# Patient Record
(demographics unavailable — no encounter records)

---

## 2025-02-18 NOTE — HISTORY OF PRESENT ILLNESS
[EENT/Resp Symptoms] : EENT/RESPIRATORY SYMPTOMS [Nasal Congestion] : nasal congestion [Cough] : cough [Decreased Appetite] : decreased appetite [Headache] : headache [Fever] : no fever [Ear Pain] : no ear pain [Sore Throat] : no sore throat [Vomiting] : no vomiting [Diarrhea] : no diarrhea [Decreased Urine Output] : no decreased urine output [FreeTextEntry5] : +sneezing [de-identified] : Runny nose, congestion and sneezing [FreeTextEntry6] :  Mother states that pt. has congestion that started two weeks ago with congestion and coughing. No fever.

## 2025-02-18 NOTE — HISTORY OF PRESENT ILLNESS
[EENT/Resp Symptoms] : EENT/RESPIRATORY SYMPTOMS [Nasal Congestion] : nasal congestion [Cough] : cough [Decreased Appetite] : decreased appetite [Headache] : headache [Fever] : no fever [Ear Pain] : no ear pain [Sore Throat] : no sore throat [Vomiting] : no vomiting [Diarrhea] : no diarrhea [Decreased Urine Output] : no decreased urine output [FreeTextEntry5] : +sneezing [de-identified] : Runny nose, congestion and sneezing [FreeTextEntry6] :  Mother states that pt. has congestion that started two weeks ago with congestion and coughing. No fever.

## 2025-02-18 NOTE — DISCUSSION/SUMMARY
[FreeTextEntry1] :   - Acute Sinusitis:  as she's showing all the symptoms such as headaches, facial pressure, thick white nasal mucus, and a throat complaint. Her symptoms have also lasted for more than a week.     - Therapeutic Interventions: Prescribe a course of antibiotics. This should help break up everything in her sinuses and help her feel better sooner. Also, recommend using a daily allergy medication like Zyrtec, and a nasal spray like Flonase.     - Diagnostic Tests: None ordered at this visit.     - Referrals: No referrals needed at this time.     - Patient Education: Discussed the symptoms and treatment plan with the patient and her mother. Emphasized the potential for diarrhea with antibiotic usage and to take with food as well as a probiotic to help counteract this possible side effect. Also mentioned that sinus washes can be beneficial.     - Follow-Up: She is advised to return or call if her symptoms do not improve in a few days or if she gets significantly worse. Also advised to check in with her neurologist if her headaches become abnormally bad.

## 2025-03-27 NOTE — HISTORY OF PRESENT ILLNESS
[Mother] : mother [LMP: _____] : LMP: [unfilled] [Cycle Length: _____ days] : Cycle Length: [unfilled] days [Days of Bleeding: _____] : Days of bleeding: [unfilled] [Eats meals with family] : eats meals with family [Grade: ____] : Grade: [unfilled] [Normal Performance] : normal performance [Normal Behavior/Attention] : normal behavior/attention [Normal Homework] : normal homework [Has friends] : has friends [Has interests/participates in community activities/volunteers] : has interests/participates in community activities/volunteers. [No] : Patient has not had sexual intercourse. [Has ways to cope with stress] : has ways to cope with stress [Displays self-confidence] : displays self-confidence [With Teen] : teen [With Parent/Guardian] : parent/guardian

## 2025-03-27 NOTE — RISK ASSESSMENT
d/c  Short term goal 1: Pt will perform bed mobility with min A  Short term goal 2: Pt will perform transfers with LRAD and max Ax1  Short term goal 3: Pt will ambulate 5' with LRAD and max Ax1  Patient Goals   Patient goals : pt did not state       Therapy Time   Individual Concurrent Group Co-treatment   Time In 1111         Time Out 1206         Minutes 55              Timed Code Treatment Minutes:   40    Total Treatment Minutes:  [address] [facility] [address], [address] [facility], [address] [facility] [PHQ-2 Negative - No further assessment needed] : PHQ-2 Negative - No further assessment needed [PHQ-9 Negative - No further assessment needed] : PHQ-9 Negative - No further assessment needed

## 2025-04-03 NOTE — QUALITY MEASURES
[Impairment in more than one setting] : Impairment in more than one setting: Not Applicable [Coexisting conditions] : Coexisting conditions: Yes [Medication choices] : Medication choices: Not Applicable [Side effects of medications] : Side effects of medications: Yes

## 2025-04-03 NOTE — ASSESSMENT
[FreeTextEntry1] : Liliya is a bright 17 y/o girl with a history of migraines without aura,  anxiety and depression, and ADHD, and is here for an ADHD follow s/p last seen in 2023. At last visit she was taking Concerta 18 mg.   Her anxiety level was moderate despite being on vacation with family.  Recommended increasing dose of Concerta 27 mg when [facility].  I had discussed with Liliya benefits of psychotherapy which is 1st line for anxiety and depression.

## 2025-04-03 NOTE — PLAN
[FreeTextEntry1] :  - -Discussed stimulant medications; risks, benefits, possible side effects. -Recommended starting psychotherapy, CBT 1st line treatment for anxiety and depression.   -Continue with primary neurologist for headache management.  -Follow up ----

## 2025-04-03 NOTE — HISTORY OF PRESENT ILLNESS
[FreeTextEntry1] : Liliya is a bright 17 y/o girl with a history of migraines without aura, anxiety and depression, and ADHD, and is here for an ADHD follow s/p last seen in 2023. At last visit she was taking Concerta 18 mg.   Her anxiety level was moderate despite being on vacation with family.  Recommended increasing dose of Concerta 27 mg when she returned from trip to [facility].  I had discussed with Liliya benefits of psychotherapy which is 1st line for anxiety and depression.   PLAN FROM PREVIOUS VISIT (2023)  -Increase Concerta to 27 mg mg day when back from vacation on 08/20.  (taking 18 mg) -Discussed stimulant medications; risks, benefits, possible side effects. -Recommended starting psychotherapy, CBT 1st line treatment for anxiety and depression.   -Continue with primary neurologist for headache management.  -Follow up in 6 weeks for medication recheck, repeat GAD7 and PHQ9, and can ask [facility]EN[facility] Martinez for list of     therapists.   INTERIM HPI Has not followed up since 07/20/23 and no refills requested.       HPI FROM VISIT ON 07/20/23  Liliya is a very bright 13 y/o girl with a history of migraines without aura and anxiety and depression, and presents  for a mental health and ADHD follow up s/p increasing Concerta to 27 mg. At 18 mg dose, Concerta helped very slightly in terms of concentration, and mother feels she seems happier and more social. Concerta was well tolerated though patient had  a few palpitations which likely was attributed to her anxiety.   PLAN FROM PREVIOUS VISIT: - Increase Concerta 27 mg mg daily in AM -Discussed stimulant medications; risks, benefits, possible side effects.  -Follow up in 2 weeks. -Continue with primary neurologist for headache management.  INTERIM HPI  The family is in [facility] on vacation until August 20 th.  Liliya has not increased the Concerta to 27 mg yet, mother states she brought the medication but wanted to finish up the 18 mg dose first.  Liliya has been a little bit resistant to taking it, does not take it every day.  Mother wants her to take it as she feels it seems to help her anxiety. She has not had any palpitations.  Mother reports Liliya is still having a lot of anxiety.  She gets very anxious when she has to order something, talk to someone new, or will be going somewhere new, a new experience.  Today they were planning to leave to go somewhere around 9 am, and she started getting reading at 5 am. Liliya has been hesitant to start seeing a psychotherapist, and mother has tried to encourage her.  She states her anxiety level recently (on vacation) has been a 4 to 5 out of 10, back in [place] was 7/10.     FROM 06/01/23 VISIT Liliya is a very bright 13 y/o girl with a history of migraines without aura and anxiety and depression, and presents  for a mental health and ADHD follow up s/p initiating Concerta 18 mg.. At previous visit GAD7 and PHQ9 scores were positive for moderate anxiety (score 10) and depression (16).   Her attention difficulties have been longstanding and likely contributing to her anxiety and depression symptoms.  She is advanced classes and struggles to maintain her grades.  PLAN FROM PREVIOUS VISIT: -Start Concerta 18 mg daily in AM -Psychoeducation provided re: ADHD.  Resources for education given -Discussed management for ADHD including behavioral interventions and medication options -Discussed stimulant medications; risks, benefits, possible side effects. -Letter to request 504 Plan for school provided -Anxiety/depression screening done- moderate symptoms of both- safety planning discussed (no risks identified) -Mother looking for psychotherapist; Liliya will start with school psychologist.  -Family provided recent neuropsychological testing results.  -Follow up in 2 weeks. -Continue with primary neurologist for headache management.  INTERIM HPI  Taking Concerta 18 mg every day for about a week.  Notices slight improvement in concentration. Mother noticed an improvement, was able to find items of a scavenger hunt.   Seems to be more talkative, more conversant.  Well tolerated.  Has mild palpitations once which patient felt could be her anxiety symptoms.   -Anxiety/depression about the same- Mother looking for psychotherapist; Liliya sees school  psychologis   FROM 05/17/23 VISIT: Liliya is a  bright 13 y/o girl with a history of migraines, and anxiety and depression, and is here for a follow up  ADHD evaluation for inattention and behavioral concerns.  At previous visit GAD7 and PHQ9 scores were positive for moderate anxiety (score 10) and depression (16).   Her attention difficulties have been longstanding and patient/mother felt was the main contributor to her anxiety and depression symptoms.  She is advanced classes and struggles to maintain her grades.    INTERIM HPI Re: inattention AL FORMS RECEIVED; PARENT- MEETS CRITERIA FOR INATTENTIVE ADHD, also + anxiety/depression.  TEACHER-  DOES NOT MEET CRITERIA;  form received-scored negative to inattention (1/9), hyperactivity, ODD, anxiety or depression. Teacher comment: "as her , I just say strongly she should have been placed in a core class rather than the regent's class.  She tries very hard but the matter might be too difficult for her due to not having a strong foundation of 6-7th grade."  Liliya had recent neuropsych evaluation on 05/05/23 done by school psychologist.  Mother sent full report which was reviewed- IQ average range (97), and average scores in reading, writing and math.  Scores were significant for inattention symptoms,  depression, anxiety, and somatization She was provided with a  504 Plan that is only for the remainder of this school year while formal ADHD diagnosis is pending.  Mother reports she was diagnosed years ago with ADHD and tried a medication but it gave her palpitations.  She does not recall who diagnosed her or the name of the medication   Liliya would like to try an ADHD medication again. No improvement in grades; mother helping her stay focused during homework   Mother asking about requesting the school to perform testing to see if she would benefit from speech therapy or technology assisted devices (explained to mother that she further testing can be requested but likely denies as not evidence she needs this).   Re: anxiety/Depression:  Liliya feels "about the same." She denies any worsening symptoms or ever having thoughts of suicide or self harm.  GAD7 score is 13 (previously 10), same moderate anxiety range PHQ9 score is 15 (previously 160, same moderately severe depression range. Due to insurance issues, mother has not looked for a therapist.  She will have new insurance in a few months. Liliya is open to talking to her school psychologist.  She is receptive to learning about behavioral activation strategies such as going outdoors, talking to friends, getting enough sleep, trying meditation.     FROM 04/13/23 VISIT Liliya is a 13 y/o girl with a history of migraines without aura and major depressive disorder as was referred by her primary neurologist for an ADHD evaluation for inattention and behavioral concerns. "It is difficult to fully assess her etiology for the concentration difficulties, as patient's difficulty concentrating may stem from major depressive disorder (PHQ-9 score of 14 on 4/6/2023), anxiety (GAD7 score of 11 on 4/6/2023), or ADHD.  Because of this, it would become important for her to be seen by a behavioral health specialist for proper management of her symptoms and appropriate pharmacotherapy options."  FROM 04/06/23 F/U HPI WITH DR SANCHES ; Additionally, mother reports that her concentration has been poor.  Mother states that she is failing her exams but doing well on homework.  She notices that Liliya takes a long time to complete her school assignments.  According to Liliya, she oftentimes zones out in conversations with friends and does not remember details of conversations due to lack of attention.  Mother also reports mood lability, as Liliya frequently becomes angry with her mother whenever she is asked to do tasks at home and oftentimes feels like she withdraws from her friends during the weekends, as she doesn't want to interact with people.  Her mother is planning to obtain a psychoeducational evaluation at school to see if she would qualify for time extension and accommodations at school.  Previously, she was seeing a therapist for her depression but stopped a couple of months ago, since Liliya does not like to open up to people.  Mother states that Liliya had tried some anti-depressant medications in the past (unclear of which kind) but states that she has stopped them because they made her feel panicky.     BIRTH HISTORY Child was born full term without complications and reached all age-appropriate developmental milestones without concerns.  DEVELOPMENTAL HISTORY Has ST in  X 2 years.    EDUCATIONAL HISTORY/ATTENTION CONCERNS Ever since  there were attention concerns, was behind in reading.  She was one of the youngest kids in her classroom. Had private tutoring at [facility] for reading and writing. Mother was very much on top of her,often needing to go over the work of the day, took hours sometimes.   Child would get frustrated, break her pencils and have tantrums but only at home.  At school always well behaved. Was 'an zabrina" at school. Early elementary was at private [employer]   [employer] and [employer].  Continued to have attention concerns and needing to go over work with mother.  In 3rd grade mother decided she was too much difficulty keeping up with school work, and had her repeat 3rd grade.If not structured learning, she was more interested in learning.   Went to public school, [employer] in [place].  Grades were mid 80's (having repeated it), also did well in 4th grade. 5th grade was beginning of Covid, was remote in 6th. Mother reached out to counselor and seemed lost with online learning.  Was in all accelerated classes. Has always been a hard worker.  7th grade was in all accelerated classes except for math.  In January started feeling depressed, felt overwhelmed, couldn't keep up.  Started seeing a counselor.   Currently in 8th grade, doing well academically. Feeling less depressed but worries about her grades and feels the depression is related to having to work so hard. Teachers allow her to stay in the class or rest during lunch if needed.  Has taken a few day off school when unbearable.   Stopped seeing her therapist in  December, was feeling better, also did not feel a good connection to her.   Her friends tell her she is so distractible. If she gets interrupted when speaking she will lose her train of thought. Patient admits she in class she often catches herself having been distracted. It's hard to listen and take notes, hard read a page sometimes without her mind wandering.  She has feels very frustrated about this.   She denies feeling overly sad and has never had SI or self injurious behavior.    Homework takes 4-5 hours to complete every evening. Was doing activities such as swimming, or dance, leisure stuff 1 day a week. Finding it hard to make time now.   HYPERACTIVITY; never overly active but always fidgeting. No impulsive or aggressive, well behaved.     CURRENT -School: , [employer] , public school, general education.  -Academic performance/grades: accelerated classes except math.  Grades above 80's. Does all assignments, but not doing as well on tests.   -Special Ed services; none; mother would like her to have a 504 Plan  -Other accommodations or services- tutoring in the past  HOME  Lives with  parents Homework: 4-5 hours to complete, independent Home behavior: Sometimes gets angry and frustrated but is a good kids, not defiant.   RELATIONSHIPS: Has best friends  EMOTIONAL see hpi  SLEEP Goes to bed by 9:30 but takes until at least 11 to fall asleep, wakes up at 5. Naps 2-3 X a week for 2-3 hours.   EATING normal  MEDICAL HISTORY:  Denies a history of TICS Denies history of starting spells, twitching, seizure-like activity.  FAMILY HISTORY:  Family history of ADHD: mother thinks she may have undiagnosed ADHD Family history of Anxiety Denies family history of cardiac conditions or early unexplained deaths?

## 2025-04-14 NOTE — QUALITY MEASURES
[Impairment in more than one setting] : Impairment in more than one setting: Yes [Medication choices] : Medication choices: Not Applicable [Side effects of medications] : Side effects of medications: Not Applicable

## 2025-04-14 NOTE — PHYSICAL EXAM
[Well-appearing] : well-appearing [Normocephalic] : normocephalic [Alert] : alert [Well related, good eye contact] : well related, good eye contact [Conversant] : conversant [Normal gait] : normal gait

## 2025-04-14 NOTE — END OF VISIT
[Time Spent: ___ minutes] : I have spent [unfilled] minutes of time on the encounter which excludes teaching and separately reported services. [FreeTextEntry3] : IBev NP, personally performed the evaluation and management (E/M) services for this established patient who presents today with (a) new problem(s)/exacerbation of (an)existing condition(s).  That E/M includes conducting the clinically appropriate interval history and exam, assessing all new/exacerbated conditions, and establishing a new plan of care. Today, FEI Galarza was here to observe &/or participate in the visit & follow up plan of care established by me.

## 2025-04-14 NOTE — PLAN
[FreeTextEntry1] : -no medication for now -Continue therapy for anxiety and depression  -Continue with primary neurologist for headache management. -Follow up as needed

## 2025-04-14 NOTE — ASSESSMENT
[FreeTextEntry1] : Liliya is a bright 17 y/o girl with a history of migraines without aura, anxiety and depression, and ADHD, and is here for an ADHD follow s/p last seen in 2023. She has an appropriate 504 plan in place. At last visit she was taking Concerta 27 mg. Liliya discontinued Concerta 27mg in September 2023 due to palpitations. Liliya reports symptoms are managable without medication. She has mild anxiety and moderate depressive symptoms being managed with therapy.

## 2025-04-14 NOTE — HISTORY OF PRESENT ILLNESS
[FreeTextEntry1] : Liliya is a bright 17 y/o girl with a history of migraines without aura, anxiety and depression, and ADHD, and is here for an ADHD follow s/p last seen in 2023. At last visit she was taking Concerta 18 mg.   Her anxiety level was moderate despite being on vacation with family.  Recommended increasing dose of Concerta 27 mg when she returned from trip to [facility].  I had discussed with Liliya benefits of psychotherapy which is 1st line for anxiety and depression.   PLAN FROM PREVIOUS VISIT (2023)  -Increase Concerta to 27 mg mg day when back from vacation on 08/20.  (taking 18 mg) -Discussed stimulant medications; risks, benefits, possible side effects. -Recommended starting psychotherapy, CBT 1st line treatment for anxiety and depression.   -Continue with primary neurologist for headache management.  -Follow up in 6 weeks for medication recheck, repeat GAD7 and PHQ9, and can ask HEN[facility] Martinez for list of therapists.   INTERIM HPI Has not followed up since 07/20/23 and no refills requested.  Liliya is now in the 10th grade in an [employer]. Continues with 504 plan for extra time with testing. Mother reported that Liliya stopped taking Concerta 27mg in September 2023 because of palpitations. Since discontinuing the medication Liliya reports she is doing well. Does not have a therapist anymore. Liliya is part of the [employer] [facility] which does group therapy based on reading the Bible which she started 3 years ago.   Liliya reported that art helps her focus and minimizes her symptoms of ADHD. Although, Liliya reports art is helping she wanted to switch schools because she wants to study neuroscience. Encouraged Liliya to find a summer camp program which includes her current area of interest rather than change schools at this time. Liliya reports she has a few good friends in her school and likes to dance for fun. Mild anxiety score of GERDA 7-7 and moderate depression PHQ 9-12. Sleep and appetite are good.  ________________________________________________ HPI FROM VISIT ON 07/20/23  Liliya is a very bright 13 y/o girl with a history of migraines without aura and anxiety and depression, and presents  for a mental health and ADHD follow up s/p increasing Concerta to 27 mg. At 18 mg dose, Concerta helped very slightly in terms of concentration, and mother feels she seems happier and more social. Concerta was well tolerated though patient had  a few palpitations which likely was attributed to her anxiety.   PLAN FROM PREVIOUS VISIT: - Increase Concerta 27 mg mg daily in AM -Discussed stimulant medications; risks, benefits, possible side effects.  -Follow up in 2 weeks. -Continue with primary neurologist for headache management.  INTERIM HPI  The family is in [facility] on vacation until August 20 th.  Liliya has not increased the Concerta to 27 mg yet, mother states she brought the medication but wanted to finish up the 18 mg dose first.  Liliya has been a little bit resistant to taking it, does not take it every day.  Mother wants her to take it as she feels it seems to help her anxiety. She has not had any palpitations.  Mother reports Liliya is still having a lot of anxiety.  She gets very anxious when she has to order something, talk to someone new, or will be going somewhere new, a new experience.  Today they were planning to leave to go somewhere around 9 am, and she started getting reading at 5 am. Liliya has been hesitant to start seeing a psychotherapist, and mother has tried to encourage her.  She states her anxiety level recently (on vacation) has been a 4 to 5 out of 10, back in [place] was 7/10.     FROM 06/01/23 VISIT Liliya is a very bright 13 y/o girl with a history of migraines without aura and anxiety and depression, and presents  for a mental health and ADHD follow up s/p initiating Concerta 18 mg.. At previous visit GAD7 and PHQ9 scores were positive for moderate anxiety (score 10) and depression (16).   Her attention difficulties have been longstanding and likely contributing to her anxiety and depression symptoms.  She is advanced classes and struggles to maintain her grades.  PLAN FROM PREVIOUS VISIT: -Start Concerta 18 mg daily in AM -Psychoeducation provided re: ADHD.  Resources for education given -Discussed management for ADHD including behavioral interventions and medication options -Discussed stimulant medications; risks, benefits, possible side effects. -Letter to request 504 Plan for school provided -Anxiety/depression screening done- moderate symptoms of both- safety planning discussed (no risks identified) -Mother looking for psychotherapist; Liliya will start with school psychologist.  -Family provided recent neuropsychological testing results.  -Follow up in 2 weeks. -Continue with primary neurologist for headache management.  INTERIM HPI  Taking Concerta 18 mg every day for about a week.  Notices slight improvement in concentration. Mother noticed an improvement, was able to find items of a scavenger hunt.   Seems to be more talkative, more conversant.  Well tolerated.  Has mild palpitations once which patient felt could be her anxiety symptoms.   -Anxiety/depression about the same- Mother looking for psychotherapist; Liliya sees school  psychologis   FROM 05/17/23 VISIT: Liliya is a  bright 13 y/o girl with a history of migraines, and anxiety and depression, and is here for a follow up  ADHD evaluation for inattention and behavioral concerns.  At previous visit GAD7 and PHQ9 scores were positive for moderate anxiety (score 10) and depression (16).   Her attention difficulties have been longstanding and patient/mother felt was the main contributor to her anxiety and depression symptoms.  She is advanced classes and struggles to maintain her grades.    INTERIM HPI Re: inattention AL FORMS RECEIVED; PARENT- MEETS CRITERIA FOR INATTENTIVE ADHD, also + anxiety/depression.  TEACHER-  DOES NOT MEET CRITERIA;  form received-scored negative to inattention (1/9), hyperactivity, ODD, anxiety or depression. Teacher comment: "as her , I just say strongly she should have been placed in a core class rather than the regent's class.  She tries very hard but the matter might be too difficult for her due to not having a strong foundation of 6-7th grade."  Liliya had recent neuropsych evaluation on 05/05/23 done by school psychologist.  Mother sent full report which was reviewed- IQ average range (97), and average scores in reading, writing and math.  Scores were significant for inattention symptoms,  depression, anxiety, and somatization She was provided with a  504 Plan that is only for the remainder of this school year while formal ADHD diagnosis is pending.  Mother reports she was diagnosed years ago with ADHD and tried a medication but it gave her palpitations.  She does not recall who diagnosed her or the name of the medication   Liliya would like to try an ADHD medication again. No improvement in grades; mother helping her stay focused during homework   Mother asking about requesting the school to perform testing to see if she would benefit from speech therapy or technology assisted devices (explained to mother that she further testing can be requested but likely denies as not evidence she needs this).   Re: anxiety/Depression:  Liliya feels "about the same." She denies any worsening symptoms or ever having thoughts of suicide or self harm.  GAD7 score is 13 (previously 10), same moderate anxiety range PHQ9 score is 15 (previously 160, same moderately severe depression range. Due to insurance issues, mother has not looked for a therapist.  She will have new insurance in a few months. Liliya is open to talking to her school psychologist.  She is receptive to learning about behavioral activation strategies such as going outdoors, talking to friends, getting enough sleep, trying meditation.     FROM 04/13/23 VISIT Liliya is a 13 y/o girl with a history of migraines without aura and major depressive disorder as was referred by her primary neurologist for an ADHD evaluation for inattention and behavioral concerns. "It is difficult to fully assess her etiology for the concentration difficulties, as patient's difficulty concentrating may stem from major depressive disorder (PHQ-9 score of 14 on 4/6/2023), anxiety (GAD7 score of 11 on 4/6/2023), or ADHD.  Because of this, it would become important for her to be seen by a behavioral health specialist for proper management of her symptoms and appropriate pharmacotherapy options."  FROM 04/06/23 F/U HPI WITH DR SANCHES ; Additionally, mother reports that her concentration has been poor.  Mother states that she is failing her exams but doing well on homework.  She notices that Liliya takes a long time to complete her school assignments.  According to Liliya, she oftentimes zones out in conversations with friends and does not remember details of conversations due to lack of attention.  Mother also reports mood lability, as Liliya frequently becomes angry with her mother whenever she is asked to do tasks at home and oftentimes feels like she withdraws from her friends during the weekends, as she doesn't want to interact with people.  Her mother is planning to obtain a psychoeducational evaluation at school to see if she would qualify for time extension and accommodations at school.  Previously, she was seeing a therapist for her depression but stopped a couple of months ago, since Liliya does not like to open up to people.  Mother states that Liliya had tried some anti-depressant medications in the past (unclear of which kind) but states that she has stopped them because they made her feel panicky.     BIRTH HISTORY Child was born full term without complications and reached all age-appropriate developmental milestones without concerns.  DEVELOPMENTAL HISTORY Has ST in  X 2 years.    EDUCATIONAL HISTORY/ATTENTION CONCERNS Ever since  there were attention concerns, was behind in reading.  She was one of the youngest kids in her classroom. Had private tutoring at [facility] for reading and writing. Mother was very much on top of her,often needing to go over the work of the day, took hours sometimes.   Child would get frustrated, break her pencils and have tantrums but only at home.  At school always well behaved. Was 'an zabrina" at school. Early elementary was at private [employer] schools   [employer] and [employer].  Continued to have attention concerns and needing to go over work with mother.  In 3rd grade mother decided she was too much difficulty keeping up with school work, and had her repeat 3rd grade.If not structured learning, she was more interested in learning.   Went to public school, [employer] 2 in [place].  Grades were mid 80's (having repeated it), also did well in 4th grade. 5th grade was beginning of Covid, was remote in 6th. Mother reached out to counselor and seemed lost with online learning.  Was in all accelerated classes. Has always been a hard worker.  7th grade was in all accelerated classes except for math.  In January started feeling depressed, felt overwhelmed, couldn't keep up.  Started seeing a counselor.   Currently in 8th grade, doing well academically. Feeling less depressed but worries about her grades and feels the depression is related to having to work so hard. Teachers allow her to stay in the class or rest during lunch if needed.  Has taken a few day off school when unbearable.   Stopped seeing her therapist in  December, was feeling better, also did not feel a good connection to her.   Her friends tell her she is so distractible. If she gets interrupted when speaking she will lose her train of thought. Patient admits she in class she often catches herself having been distracted. It's hard to listen and take notes, hard read a page sometimes without her mind wandering.  She has feels very frustrated about this.   She denies feeling overly sad and has never had SI or self injurious behavior.    Homework takes 4-5 hours to complete every evening. Was doing activities such as swimming, or dance, leisure stuff 1 day a week. Finding it hard to make time now.   HYPERACTIVITY; never overly active but always fidgeting. No impulsive or aggressive, well behaved.     CURRENT -School: , [employer] , public school, general education.  -Academic performance/grades: accelerated classes except math.  Grades above 80's. Does all assignments, but not doing as well on tests.   -Special Ed services; none; mother would like her to have a 504 Plan  -Other accommodations or services- tutoring in the past  HOME  Lives with  parents Homework: 4-5 hours to complete, independent Home behavior: Sometimes gets angry and frustrated but is a good kids, not defiant.   RELATIONSHIPS: Has best friends  EMOTIONAL see hpi  SLEEP Goes to bed by 9:30 but takes until at least 11 to fall asleep, wakes up at 5. Naps 2-3 X a week for 2-3 hours.   EATING normal  MEDICAL HISTORY:  Denies a history of TICS Denies history of starting spells, twitching, seizure-like activity.  FAMILY HISTORY:  Family history of ADHD: mother thinks she may have undiagnosed ADHD Family history of Anxiety Denies family history of cardiac conditions or early unexplained deaths?

## 2025-04-14 NOTE — ASSESSMENT
[FreeTextEntry1] : Liliya is a bright 15 y/o girl with a history of migraines without aura, anxiety and depression, and ADHD, and is here for an ADHD follow s/p last seen in 2023. She has an appropriate 504 plan in place. At last visit she was taking Concerta 27 mg. Liliya discontinued Concerta 27mg in September 2023 due to palpitations. Liliya reports symptoms are managable without medication. She has mild anxiety and moderate depressive symptoms being managed with therapy.

## 2025-04-14 NOTE — HISTORY OF PRESENT ILLNESS
[FreeTextEntry1] : Liliya is a bright 17 y/o girl with a history of migraines without aura, anxiety and depression, and ADHD, and is here for an ADHD follow s/p last seen in 2023. At last visit she was taking Concerta 18 mg.   Her anxiety level was moderate despite being on vacation with family.  Recommended increasing dose of Concerta 27 mg when she returned from trip to [facility].  I had discussed with Liliya benefits of psychotherapy which is 1st line for anxiety and depression.   PLAN FROM PREVIOUS VISIT (2023)  -Increase Concerta to 27 mg mg day when back from vacation on 08/20.  (taking 18 mg) -Discussed stimulant medications; risks, benefits, possible side effects. -Recommended starting psychotherapy, CBT 1st line treatment for anxiety and depression.   -Continue with primary neurologist for headache management.  -Follow up in 6 weeks for medication recheck, repeat GAD7 and PHQ9, and can ask [facility]EN[facility] Martinez for list of therapists.   INTERIM HPI Has not followed up since 07/20/23 and no refills requested.  Liliya is now in the 10th grade in an [employer]. Continues with 504 plan for extra time with testing. Mother reported that Liliya stopped taking Concerta 27mg in September 2023 because of palpitations. Since discontinuing the medication Liliya reports she is doing well. Does not have a therapist anymore. Liliya is part of the [employer] which does group therapy based on reading the Bible which she started 3 years ago.   Liliya reported that art helps her focus and minimizes her symptoms of ADHD. Although, Liliya reports art is helping she wanted to switch schools because she wants to study neuroscience. Encouraged Liliya to find a summer camp program which includes her current area of interest rather than change schools at this time. Liliya reports she has a few good friends in her school and likes to dance for fun. Mild anxiety score of GERDA 7-7 and moderate depression PHQ 9-12. Sleep and appetite are good.  ________________________________________________ HPI FROM VISIT ON 07/20/23  Liliya is a very bright 15 y/o girl with a history of migraines without aura and anxiety and depression, and presents  for a mental health and ADHD follow up s/p increasing Concerta to 27 mg. At 18 mg dose, Concerta helped very slightly in terms of concentration, and mother feels she seems happier and more social. Concerta was well tolerated though patient had  a few palpitations which likely was attributed to her anxiety.   PLAN FROM PREVIOUS VISIT: - Increase Concerta 27 mg mg daily in AM -Discussed stimulant medications; risks, benefits, possible side effects.  -Follow up in 2 weeks. -Continue with primary neurologist for headache management.  INTERIM HPI  The family is in [facility] on vacation until August 20 th.  Liliya has not increased the Concerta to 27 mg yet, mother states she brought the medication but wanted to finish up the 18 mg dose first.  Liliya has been a little bit resistant to taking it, does not take it every day.  Mother wants her to take it as she feels it seems to help her anxiety. She has not had any palpitations.  Mother reports Liliya is still having a lot of anxiety.  She gets very anxious when she has to order something, talk to someone new, or will be going somewhere new, a new experience.  Today they were planning to leave to go somewhere around 9 am, and she started getting reading at 5 am. Liliya has been hesitant to start seeing a psychotherapist, and mother has tried to encourage her.  She states her anxiety level recently (on vacation) has been a 4 to 5 out of 10, back in [place] was 7/10.     FROM 06/01/23 VISIT Liliya is a very bright 15 y/o girl with a history of migraines without aura and anxiety and depression, and presents  for a mental health and ADHD follow up s/p initiating Concerta 18 mg.. At previous visit GAD7 and PHQ9 scores were positive for moderate anxiety (score 10) and depression (16).   Her attention difficulties have been longstanding and likely contributing to her anxiety and depression symptoms.  She is advanced classes and struggles to maintain her grades.  PLAN FROM PREVIOUS VISIT: -Start Concerta 18 mg daily in AM -Psychoeducation provided re: ADHD.  Resources for education given -Discussed management for ADHD including behavioral interventions and medication options -Discussed stimulant medications; risks, benefits, possible side effects. -Letter to request 504 Plan for school provided -Anxiety/depression screening done- moderate symptoms of both- safety planning discussed (no risks identified) -Mother looking for psychotherapist; Liliya will start with school psychologist.  -Family provided recent neuropsychological testing results.  -Follow up in 2 weeks. -Continue with primary neurologist for headache management.  INTERIM HPI  Taking Concerta 18 mg every day for about a week.  Notices slight improvement in concentration. Mother noticed an improvement, was able to find items of a scavenger hunt.   Seems to be more talkative, more conversant.  Well tolerated.  Has mild palpitations once which patient felt could be her anxiety symptoms.   -Anxiety/depression about the same- Mother looking for psychotherapist; Liliya sees school  psychologis   FROM 05/17/23 VISIT: Liliya is a  bright 15 y/o girl with a history of migraines, and anxiety and depression, and is here for a follow up  ADHD evaluation for inattention and behavioral concerns.  At previous visit GAD7 and PHQ9 scores were positive for moderate anxiety (score 10) and depression (16).   Her attention difficulties have been longstanding and patient/mother felt was the main contributor to her anxiety and depression symptoms.  She is advanced classes and struggles to maintain her grades.    INTERIM HPI Re: inattention AL FORMS RECEIVED; PARENT- MEETS CRITERIA FOR INATTENTIVE ADHD, also + anxiety/depression.  TEACHER-  DOES NOT MEET CRITERIA;  form received-scored negative to inattention (1/9), hyperactivity, ODD, anxiety or depression. Teacher comment: "as her , I just say strongly she should have been placed in a core class rather than the regent's class.  She tries very hard but the matter might be too difficult for her due to not having a strong foundation of 6-7th grade."  Liliya had recent neuropsych evaluation on 05/05/23 done by school psychologist.  Mother sent full report which was reviewed- IQ average range (97), and average scores in reading, writing and math.  Scores were significant for inattention symptoms,  depression, anxiety, and somatization She was provided with a  504 Plan that is only for the remainder of this school year while formal ADHD diagnosis is pending.  Mother reports she was diagnosed years ago with ADHD and tried a medication but it gave her palpitations.  She does not recall who diagnosed her or the name of the medication   Liliya would like to try an ADHD medication again. No improvement in grades; mother helping her stay focused during homework   Mother asking about requesting the school to perform testing to see if she would benefit from speech therapy or technology assisted devices (explained to mother that she further testing can be requested but likely denies as not evidence she needs this).   Re: anxiety/Depression:  Liliya feels "about the same." She denies any worsening symptoms or ever having thoughts of suicide or self harm.  GAD7 score is 13 (previously 10), same moderate anxiety range PHQ9 score is 15 (previously 160, same moderately severe depression range. Due to insurance issues, mother has not looked for a therapist.  She will have new insurance in a few months. Liliya is open to talking to her school psychologist.  She is receptive to learning about behavioral activation strategies such as going outdoors, talking to friends, getting enough sleep, trying meditation.     FROM 04/13/23 VISIT Liliya is a 15 y/o girl with a history of migraines without aura and major depressive disorder as was referred by her primary neurologist for an ADHD evaluation for inattention and behavioral concerns. "It is difficult to fully assess her etiology for the concentration difficulties, as patient's difficulty concentrating may stem from major depressive disorder (PHQ-9 score of 14 on 4/6/2023), anxiety (GAD7 score of 11 on 4/6/2023), or ADHD.  Because of this, it would become important for her to be seen by a behavioral health specialist for proper management of her symptoms and appropriate pharmacotherapy options."  FROM 04/06/23 F/U HPI WITH DR SANCHES ; Additionally, mother reports that her concentration has been poor.  Mother states that she is failing her exams but doing well on homework.  She notices that Liliya takes a long time to complete her school assignments.  According to Liliya, she oftentimes zones out in conversations with friends and does not remember details of conversations due to lack of attention.  Mother also reports mood lability, as Liliya frequently becomes angry with her mother whenever she is asked to do tasks at home and oftentimes feels like she withdraws from her friends during the weekends, as she doesn't want to interact with people.  Her mother is planning to obtain a psychoeducational evaluation at school to see if she would qualify for time extension and accommodations at school.  Previously, she was seeing a therapist for her depression but stopped a couple of months ago, since Liliya does not like to open up to people.  Mother states that Liliya had tried some anti-depressant medications in the past (unclear of which kind) but states that she has stopped them because they made her feel panicky.     BIRTH HISTORY Child was born full term without complications and reached all age-appropriate developmental milestones without concerns.  DEVELOPMENTAL HISTORY Has ST in  X 2 years.    EDUCATIONAL HISTORY/ATTENTION CONCERNS Ever since  there were attention concerns, was behind in reading.  She was one of the youngest kids in her classroom. Had private tutoring at [facility] for reading and writing. Mother was very much on top of her,often needing to go over the work of the day, took hours sometimes.   Child would get frustrated, break her pencils and have tantrums but only at home.  At school always well behaved. Was 'an zabrina" at school. Early elementary was at private [employer]   [employer] and [employer].  Continued to have attention concerns and needing to go over work with mother.  In 3rd grade mother decided she was too much difficulty keeping up with school work, and had her repeat 3rd grade.If not structured learning, she was more interested in learning.   Went to public school, [employer] in [place].  Grades were mid 80's (having repeated it), also did well in 4th grade. 5th grade was beginning of Covid, was remote in 6th. Mother reached out to counselor and seemed lost with online learning.  Was in all accelerated classes. Has always been a hard worker.  7th grade was in all accelerated classes except for math.  In January started feeling depressed, felt overwhelmed, couldn't keep up.  Started seeing a counselor.   Currently in 8th grade, doing well academically. Feeling less depressed but worries about her grades and feels the depression is related to having to work so hard. Teachers allow her to stay in the class or rest during lunch if needed.  Has taken a few day off school when unbearable.   Stopped seeing her therapist in  December, was feeling better, also did not feel a good connection to her.   Her friends tell her she is so distractible. If she gets interrupted when speaking she will lose her train of thought. Patient admits she in class she often catches herself having been distracted. It's hard to listen and take notes, hard read a page sometimes without her mind wandering.  She has feels very frustrated about this.   She denies feeling overly sad and has never had SI or self injurious behavior.    Homework takes 4-5 hours to complete every evening. Was doing activities such as swimming, or dance, leisure stuff 1 day a week. Finding it hard to make time now.   HYPERACTIVITY; never overly active but always fidgeting. No impulsive or aggressive, well behaved.     CURRENT -School: , [employer] public school, general education.  -Academic performance/grades: accelerated classes except math.  Grades above 80's. Does all assignments, but not doing as well on tests.   -Special Ed services; none; mother would like her to have a 504 Plan  -Other accommodations or services- tutoring in the past  HOME  Lives with  parents Homework: 4-5 hours to complete, independent Home behavior: Sometimes gets angry and frustrated but is a good kids, not defiant.   RELATIONSHIPS: Has best friends  EMOTIONAL see hpi  SLEEP Goes to bed by 9:30 but takes until at least 11 to fall asleep, wakes up at 5. Naps 2-3 X a week for 2-3 hours.   EATING normal  MEDICAL HISTORY:  Denies a history of TICS Denies history of starting spells, twitching, seizure-like activity.  FAMILY HISTORY:  Family history of ADHD: mother thinks she may have undiagnosed ADHD Family history of Anxiety Denies family history of cardiac conditions or early unexplained deaths?

## 2025-05-27 NOTE — PLAN
[FreeTextEntry1] : -Continue Rizatriptan PRN 10mg ODT\par  -Continue migrelief daily\par  -Continue follow up with behavioral health NP\par  -Follow up in 3 months

## 2025-05-27 NOTE — REVIEW OF SYSTEMS
[Normal] : Hematologic/Lymphatic [FreeTextEntry8] : per HPI [de-identified] : depression and anxiety

## 2025-05-27 NOTE — ASSESSMENT
[FreeTextEntry1] : This is a 14 year old F with hx of migraine without aura, ADHD, anxiety and depression presenting for follow up migraine management. Pt's symptoms have been well controlled on PRN rizatriptan at this time. Reported inconsistently taking migrelief at this time. Neurologic exam remains unremarkable. Lifestyle modifications, such as sleep hygiene, good hydration discussed. Will obtain MR head outpt setting and continue current regimen and follow up in 3 months.

## 2025-05-27 NOTE — PHYSICAL EXAM
[Well-appearing] : well-appearing [Normocephalic] : normocephalic [No dysmorphic facial features] : no dysmorphic facial features [No ocular abnormalities] : no ocular abnormalities [Neck supple] : neck supple [No deformities] : no deformities [Alert] : alert [Well related, good eye contact] : well related, good eye contact [Conversant] : conversant [Normal speech and language] : normal speech and language [Follows instructions well] : follows instructions well [VFF] : VFF [Pupils reactive to light and accommodation] : pupils reactive to light and accommodation [Full extraocular movements] : full extraocular movements [No nystagmus] : no nystagmus [No papilledema] : no papilledema [Normal facial sensation to light touch] : normal facial sensation to light touch [No facial asymmetry or weakness] : no facial asymmetry or weakness [Gross hearing intact] : gross hearing intact [Equal palate elevation] : equal palate elevation [Good shoulder shrug] : good shoulder shrug [Normal tongue movement] : normal tongue movement [Midline tongue, no fasciculations] : midline tongue, no fasciculations [Normal axial and appendicular muscle tone] : normal axial and appendicular muscle tone [Gets up on table without difficulty] : gets up on table without difficulty [No pronator drift] : no pronator drift [No abnormal involuntary movements] : no abnormal involuntary movements [5/5 strength in proximal and distal muscles of arms and legs] : 5/5 strength in proximal and distal muscles of arms and legs [Walks and runs well] : walks and runs well [2+ biceps] : 2+ biceps [Triceps] : triceps [Knee jerks] : knee jerks [Ankle jerks] : ankle jerks [No ankle clonus] : no ankle clonus [No dysmetria on FTNT] : no dysmetria on FTNT [Good walking balance] : good walking balance [Normal gait] : normal gait

## 2025-05-27 NOTE — HISTORY OF PRESENT ILLNESS
[FreeTextEntry1] : Liliya is a 14 year old girl with history of migraines without aura, ADHD on concerta, anxiety and major depressive disorder presenting for follow up for migraines. Last seen in April 2023, was started on migrelief and prescribed rizatriptan PRN for migraine management. Pt reports using approximately 8 to 10 times since last visit, about once per week or so. Headaches occur twice a week and 6 out of 10 lasts about 12 hrs or so, usually bilateral, and frontal, no radiation, denies n/v, change in vision, nocturnal awakenings.  Since last visit, patient has been started on Concerta for ADHD and is currently looking for psychotherapist for her moderate anxiety and depression. Her concentration has improved since being on concerta, was able to do well on her last math test which never happened before.  Pt's sleep schedule is more regular at this time. On weekdays pt goes to bed at 9pm and sleeps through the night until 5am. On weekends sleeps at 11 or so and wakes up at 10am. Sleep hygiene reportedly improved per mother.  Medication: Migrelief daily Rizatriptan 10mg PRN Concerta 27mg daily  8/31/2023 with the  mother. Liliya has had less headaches in the summer. Has been having headaches x 2 years now. Rizatriptan 10mg helps with the headaches.   5/27/2025  with the [facility]. No change of headaches frequency. Mosatly in school. Mostly when anxious. Occur when daily living disrupted. Respods well to Rizatriptan

## 2025-05-27 NOTE — CONSULT LETTER
[Dear  ___] : Dear  [unfilled], [Consult Letter:] : I had the pleasure of evaluating your patient, [unfilled]. [Please see my note below.] : Please see my note below. [Consult Closing:] : Thank you very much for allowing me to participate in the care of this patient.  If you have any questions, please do not hesitate to contact me. [Sincerely,] : Sincerely, [FreeTextEntry3] : Yemi Mcfadden MD\par  Pediatric Neurology at [facility]\par  Attended by Dr. Spaulding, attending Pediatric Neurologist